# Patient Record
Sex: FEMALE | Race: OTHER | HISPANIC OR LATINO | URBAN - METROPOLITAN AREA
[De-identification: names, ages, dates, MRNs, and addresses within clinical notes are randomized per-mention and may not be internally consistent; named-entity substitution may affect disease eponyms.]

---

## 2017-09-16 ENCOUNTER — OUTPATIENT (OUTPATIENT)
Dept: OUTPATIENT SERVICES | Facility: HOSPITAL | Age: 37
LOS: 1 days | Discharge: HOME | End: 2017-09-16

## 2017-09-16 DIAGNOSIS — Z12.31 ENCOUNTER FOR SCREENING MAMMOGRAM FOR MALIGNANT NEOPLASM OF BREAST: ICD-10-CM

## 2019-01-15 ENCOUNTER — OUTPATIENT (OUTPATIENT)
Dept: OUTPATIENT SERVICES | Facility: HOSPITAL | Age: 39
LOS: 1 days | Discharge: HOME | End: 2019-01-15

## 2019-01-15 DIAGNOSIS — E11.9 TYPE 2 DIABETES MELLITUS WITHOUT COMPLICATIONS: ICD-10-CM

## 2019-01-15 DIAGNOSIS — R73.09 OTHER ABNORMAL GLUCOSE: ICD-10-CM

## 2019-01-15 DIAGNOSIS — Z13.0 ENCOUNTER FOR SCREENING FOR DISEASES OF THE BLOOD AND BLOOD-FORMING ORGANS AND CERTAIN DISORDERS INVOLVING THE IMMUNE MECHANISM: ICD-10-CM

## 2019-01-15 DIAGNOSIS — N39.0 URINARY TRACT INFECTION, SITE NOT SPECIFIED: ICD-10-CM

## 2019-01-15 DIAGNOSIS — R97.0 ELEVATED CARCINOEMBRYONIC ANTIGEN [CEA]: ICD-10-CM

## 2019-01-15 DIAGNOSIS — R79.89 OTHER SPECIFIED ABNORMAL FINDINGS OF BLOOD CHEMISTRY: ICD-10-CM

## 2019-01-15 DIAGNOSIS — Z00.00 ENCOUNTER FOR GENERAL ADULT MEDICAL EXAMINATION WITHOUT ABNORMAL FINDINGS: ICD-10-CM

## 2019-01-15 DIAGNOSIS — R94.6 ABNORMAL RESULTS OF THYROID FUNCTION STUDIES: ICD-10-CM

## 2019-01-15 DIAGNOSIS — Z13.21 ENCOUNTER FOR SCREENING FOR NUTRITIONAL DISORDER: ICD-10-CM

## 2019-01-15 DIAGNOSIS — R79.1 ABNORMAL COAGULATION PROFILE: ICD-10-CM

## 2019-01-15 DIAGNOSIS — Z13.220 ENCOUNTER FOR SCREENING FOR LIPOID DISORDERS: ICD-10-CM

## 2023-06-08 PROBLEM — Z00.00 ENCOUNTER FOR PREVENTIVE HEALTH EXAMINATION: Status: ACTIVE | Noted: 2023-06-08

## 2023-06-16 ENCOUNTER — APPOINTMENT (OUTPATIENT)
Dept: OBGYN | Facility: CLINIC | Age: 43
End: 2023-06-16

## 2023-08-01 ENCOUNTER — APPOINTMENT (OUTPATIENT)
Dept: PAIN MANAGEMENT | Facility: CLINIC | Age: 43
End: 2023-08-01
Payer: MEDICAID

## 2023-08-01 VITALS — WEIGHT: 118 LBS | BODY MASS INDEX: 15.98 KG/M2 | HEIGHT: 72 IN

## 2023-08-01 DIAGNOSIS — M54.16 RADICULOPATHY, LUMBAR REGION: ICD-10-CM

## 2023-08-01 DIAGNOSIS — Z86.79 PERSONAL HISTORY OF OTHER DISEASES OF THE CIRCULATORY SYSTEM: ICD-10-CM

## 2023-08-01 DIAGNOSIS — Z83.3 FAMILY HISTORY OF DIABETES MELLITUS: ICD-10-CM

## 2023-08-01 DIAGNOSIS — Z78.9 OTHER SPECIFIED HEALTH STATUS: ICD-10-CM

## 2023-08-01 DIAGNOSIS — Z82.49 FAMILY HISTORY OF ISCHEMIC HEART DISEASE AND OTHER DISEASES OF THE CIRCULATORY SYSTEM: ICD-10-CM

## 2023-08-01 PROCEDURE — 99204 OFFICE O/P NEW MOD 45 MIN: CPT

## 2023-08-01 RX ORDER — METHYLPREDNISOLONE 4 MG/1
4 TABLET ORAL
Qty: 1 | Refills: 0 | Status: ACTIVE | COMMUNITY
Start: 2023-08-01 | End: 1900-01-01

## 2023-08-01 RX ORDER — IBUPROFEN 800 MG/1
800 TABLET, FILM COATED ORAL
Refills: 0 | Status: ACTIVE | COMMUNITY

## 2023-08-02 ENCOUNTER — TRANSCRIPTION ENCOUNTER (OUTPATIENT)
Age: 43
End: 2023-08-02

## 2023-08-04 NOTE — HISTORY OF PRESENT ILLNESS
[FreeTextEntry1] : HPI: Ms. Luevano is a 43 year old female complaining of back pian. This pain started 3 days ago. The pain has been severe that is constant of the time with symptoms worsening in no typical pattern. This patient is currently in the progress of moving and has been lifting and carrying heavy objects. The patient presents with complaints of low back pain. Pain is located in the bilateral low back above the waistband. The pain is mainly located in the low back on both sides along the paraspinal muscles and the top of the buttock bilaterally. The pain is aching and throbbing in the back with occasional sharp stabbing sensations that can be very sudden and debilitating. The pain is made worse with standing from a seated position and with rotation to the left and right side. Pain is associated with stiffness. The patient's functional status and quality of life has been significantly impacted negatively from the pain and has not responded to conservative measures such as rest, heat, otc pain medications, and activity modifications. Pain is 8/10 at worst. Patient denies any fevers, chills, weight loss, bowel or bladder dysfunction, incontinence, or saddle anesthesia.  ACTIVITIES: Patient could walk 4-5 blocks before the pain starts.  Patient can sit  10-15 minutes before pain starts.  Patient can stand 10-15 minutes before pain starts.  The patient often lies down because of pain.  Patient uses no assisted walking device at this time.  Patient has difficulty performing household chores, going to work, and  exercise at this time.  Prior pain meds: Tylenol, ibuprofen 800 mg

## 2023-08-04 NOTE — PHYSICAL EXAM
[de-identified] : Lumbar Spine Exam: Inspection: erythema (-) ecchymosis (-) rashes (-) alignment: no scoliosis  Palpation: Midline lumbar tenderness:             (-) paraspinal tenderness:                  L (+) ; R (+) sciatic nerve tenderness :             L (+) ; R (+) SI joint tenderness:                        L (-) ; R (-) GTB tenderness:                            L (-);  R (-) pain is worse with flexion   Limited ROM 2/2 to pain  Strength: 5/5 throughout all muscle groups of the lower extremity                                     Right       Left    Hip Flexion:                (5/5)       (5/5) Quadriceps:               (5/5)       (5/5) Hamstrings:                (5/5)       (5/5) Ankle Dorsiflexion:     (5/5)       (5/5) EHL:                           (5/5)       (5/5) Ankle Plantarflexion:  (5/5)       (5/5)  Special Tests: SLR:                           R (-) ; L (-) Facet loading:            R (-) ; L (-) ABENA test:               R (-) ; L (-)  Neurologic: Light touch intact throughout LE  Reflexes normal and symmetric   Gait: non- antalgic gait ambulates w/o assistive device

## 2023-08-04 NOTE — DISCUSSION/SUMMARY
[de-identified] : A discussion regarding available pain management treatment options occurred with the patient.  These included interventional, rehabilitative, pharmacological, and alternative modalities. We will proceed with the following:  Interventional treatment options: - None indicated at present time  Rehabilitative options: Initiate trial of PT for the low back   - participation in active HEP was discussed Patient given specific exercises to do including but not limited to: side plank, Quadruped arm/leg raise, Extension exercise, and Glut Bridges   Medication based treatment options: - initiate trial of Medrol dose pack   Complementary treatment options: - lifestyle modifications discussed avoid lifting, avoid bending forward, bend with knees. Be mindful of good posture.   follow up 7 weeks   I Yadira Guerrier attest that this documentation has been prepared under the direction and in the presence of provider Dr. Ifeanyi Maldonado.  The documentation recorded by the scribe in my presence, accurately reflects the service I personally performed, and the decisions made by me with my edits as appropriate.   Ifeanyi Maldonado, DO

## 2023-08-25 ENCOUNTER — APPOINTMENT (OUTPATIENT)
Dept: ORTHOPEDIC SURGERY | Facility: CLINIC | Age: 43
End: 2023-08-25

## 2023-09-19 ENCOUNTER — APPOINTMENT (OUTPATIENT)
Dept: PAIN MANAGEMENT | Facility: CLINIC | Age: 43
End: 2023-09-19

## 2024-08-26 ENCOUNTER — APPOINTMENT (OUTPATIENT)
Dept: CARDIOLOGY | Facility: CLINIC | Age: 44
End: 2024-08-26
Payer: COMMERCIAL

## 2024-08-26 VITALS — DIASTOLIC BLOOD PRESSURE: 80 MMHG | HEART RATE: 81 BPM | SYSTOLIC BLOOD PRESSURE: 130 MMHG

## 2024-08-26 VITALS — HEIGHT: 72 IN | WEIGHT: 120 LBS | BODY MASS INDEX: 16.25 KG/M2

## 2024-08-26 DIAGNOSIS — D64.9 ANEMIA, UNSPECIFIED: ICD-10-CM

## 2024-08-26 DIAGNOSIS — Z82.3 FAMILY HISTORY OF STROKE: ICD-10-CM

## 2024-08-26 DIAGNOSIS — I20.9 ANGINA PECTORIS, UNSPECIFIED: ICD-10-CM

## 2024-08-26 DIAGNOSIS — I10 ESSENTIAL (PRIMARY) HYPERTENSION: ICD-10-CM

## 2024-08-26 DIAGNOSIS — E78.2 MIXED HYPERLIPIDEMIA: ICD-10-CM

## 2024-08-26 DIAGNOSIS — Z00.00 ENCOUNTER FOR GENERAL ADULT MEDICAL EXAMINATION W/OUT ABNORMAL FINDINGS: ICD-10-CM

## 2024-08-26 PROCEDURE — 93000 ELECTROCARDIOGRAM COMPLETE: CPT

## 2024-08-26 PROCEDURE — 99204 OFFICE O/P NEW MOD 45 MIN: CPT | Mod: 25

## 2024-08-26 RX ORDER — AMLODIPINE BESYLATE 5 MG/1
5 TABLET ORAL
Refills: 0 | Status: ACTIVE | COMMUNITY

## 2024-08-26 RX ORDER — METOPROLOL TARTRATE 100 MG/1
100 TABLET, FILM COATED ORAL
Qty: 2 | Refills: 0 | Status: ACTIVE | COMMUNITY
Start: 2024-08-26 | End: 1900-01-01

## 2024-08-26 RX ORDER — RANOLAZINE 500 MG/1
500 TABLET, EXTENDED RELEASE ORAL
Qty: 180 | Refills: 3 | Status: ACTIVE | COMMUNITY
Start: 2024-08-26 | End: 1900-01-01

## 2024-08-26 NOTE — PHYSICAL EXAM
[Normal Venous Pressure] : normal venous pressure [Normal S1, S2] : normal S1, S2 [Clear Lung Fields] : clear lung fields [Soft] : abdomen soft [No Edema] : no edema [de-identified] : rrr

## 2024-08-26 NOTE — DISCUSSION/SUMMARY
[FreeTextEntry1] : pt with anemia as etiology  start ranolazine 500 mg po q12  has fam h/o of cva  get carotid  get CTA 2 d echo  f/u after testing.  will d/w pmd about anemia.  [EKG obtained to assist in diagnosis and management of assessed problem(s)] : EKG obtained to assist in diagnosis and management of assessed problem(s)

## 2024-08-26 NOTE — PHYSICAL EXAM
[Normal Venous Pressure] : normal venous pressure [Normal S1, S2] : normal S1, S2 [Clear Lung Fields] : clear lung fields [Soft] : abdomen soft [No Edema] : no edema [de-identified] : rrr

## 2024-08-26 NOTE — HISTORY OF PRESENT ILLNESS
[FreeTextEntry1] : Patient with HTN, family hx of CVA mother in 50s and gm and moms siblings, anemia referred by MEGAN Church  Patient states 2-3 weeks ago she felt chest heaviness after patient hurt wrist 3-4 weeks ago. Pt told pmd and says intermittent felt like pressure for approximately an hour and relaxed and improved. no sob. pt says had one episode of numbness on her arm.  no n/v diaphoresis with episodes.  pt has been under stress. pt has episodes of palpitations. pt denies h/o anxiety. pt denies syncope. pt works as a nurse in a hospital.  8/24: HDL: 65,  TC: 201  h/h 10.9/32.3  pt does walking but does not exercise.

## 2024-08-29 ENCOUNTER — OUTPATIENT (OUTPATIENT)
Dept: OUTPATIENT SERVICES | Facility: HOSPITAL | Age: 44
LOS: 1 days | End: 2024-08-29
Payer: COMMERCIAL

## 2024-08-29 DIAGNOSIS — R76.12 NONSPECIFIC REACTION TO CELL MEDIATED IMMUNITY MEASUREMENT OF GAMMA INTERFERON ANTIGEN RESPONSE WITHOUT ACTIVE TUBERCULOSIS: ICD-10-CM

## 2024-08-29 PROCEDURE — 71046 X-RAY EXAM CHEST 2 VIEWS: CPT

## 2024-08-29 PROCEDURE — 71046 X-RAY EXAM CHEST 2 VIEWS: CPT | Mod: 26

## 2024-08-30 ENCOUNTER — APPOINTMENT (OUTPATIENT)
Dept: CARDIOLOGY | Facility: CLINIC | Age: 44
End: 2024-08-30

## 2024-08-30 DIAGNOSIS — R76.12 NONSPECIFIC REACTION TO CELL MEDIATED IMMUNITY MEASUREMENT OF GAMMA INTERFERON ANTIGEN RESPONSE WITHOUT ACTIVE TUBERCULOSIS: ICD-10-CM

## 2024-08-30 PROCEDURE — 93880 EXTRACRANIAL BILAT STUDY: CPT

## 2024-09-12 ENCOUNTER — APPOINTMENT (OUTPATIENT)
Dept: CARDIOLOGY | Facility: CLINIC | Age: 44
End: 2024-09-12

## 2024-09-12 PROCEDURE — 93306 TTE W/DOPPLER COMPLETE: CPT

## 2024-09-19 ENCOUNTER — RESULT REVIEW (OUTPATIENT)
Age: 44
End: 2024-09-19

## 2024-09-19 ENCOUNTER — OUTPATIENT (OUTPATIENT)
Dept: OUTPATIENT SERVICES | Facility: HOSPITAL | Age: 44
LOS: 1 days | End: 2024-09-19
Payer: COMMERCIAL

## 2024-09-19 DIAGNOSIS — Z00.8 ENCOUNTER FOR OTHER GENERAL EXAMINATION: ICD-10-CM

## 2024-09-19 DIAGNOSIS — I20.9 ANGINA PECTORIS, UNSPECIFIED: ICD-10-CM

## 2024-09-19 DIAGNOSIS — E78.2 MIXED HYPERLIPIDEMIA: ICD-10-CM

## 2024-09-19 PROCEDURE — 75574 CT ANGIO HRT W/3D IMAGE: CPT

## 2024-09-19 PROCEDURE — 75574 CT ANGIO HRT W/3D IMAGE: CPT | Mod: 26

## 2024-09-20 DIAGNOSIS — E78.2 MIXED HYPERLIPIDEMIA: ICD-10-CM

## 2024-09-20 DIAGNOSIS — I20.9 ANGINA PECTORIS, UNSPECIFIED: ICD-10-CM

## 2024-09-30 ENCOUNTER — APPOINTMENT (OUTPATIENT)
Dept: CARDIOLOGY | Facility: CLINIC | Age: 44
End: 2024-09-30

## 2024-10-04 ENCOUNTER — APPOINTMENT (OUTPATIENT)
Dept: CARDIOLOGY | Facility: CLINIC | Age: 44
End: 2024-10-04
Payer: COMMERCIAL

## 2024-10-04 VITALS
BODY MASS INDEX: 23.56 KG/M2 | WEIGHT: 120 LBS | HEIGHT: 60 IN | HEART RATE: 74 BPM | SYSTOLIC BLOOD PRESSURE: 146 MMHG | DIASTOLIC BLOOD PRESSURE: 89 MMHG

## 2024-10-04 DIAGNOSIS — I10 ESSENTIAL (PRIMARY) HYPERTENSION: ICD-10-CM

## 2024-10-04 DIAGNOSIS — E78.2 MIXED HYPERLIPIDEMIA: ICD-10-CM

## 2024-10-04 PROCEDURE — 99204 OFFICE O/P NEW MOD 45 MIN: CPT

## 2024-10-04 NOTE — END OF VISIT
[Time Spent: ___ minutes] : I have spent [unfilled] minutes of time on the encounter which excludes teaching and separately reported services. Attending with

## 2024-10-06 NOTE — HISTORY OF PRESENT ILLNESS
[FreeTextEntry1] : Of note, MAXWELL SANDOVAL is a patient of Dr. Estrella.  MAXWELL SANDOVAL is a 44-year-old female, with a PMHx significant for HTN, HLD, and anemia, who presents today for a follow-up visit for results. Echo was WNL. CCTA revealed normal coronary arteries. Carotid duplex revealed tortuous carotid arteries, otherwise no obstructive disease. Patient discontinued Amlodipine due to leg edema. BP has been mildly elevated, mostly diastolic HTN upon review of BP diary.   Family History: (+) CVA: mother in 50s, grandmother, mother's siblings.

## 2024-10-06 NOTE — CARDIOLOGY SUMMARY
[de-identified] : 10/04/2024: NSR, normal axis, normal intervals, (-) ST-T wave changes. ======================================================= [de-identified] : TTE - 09/12/2024: CONCLUSIONS: 1. Left ventricular cavity is normal in size. Left ventricular wall thickness is normal. Left ventricular systolic function is normal with an ejection fraction of 66 % by Tavarez's method of disks. There are no regional wall motion abnormalities seen. 2. E' sept: 0.07 m/s, lvot vti: 20.7 cm, GLS: -21.1%. 3. Normal left ventricular diastolic function, with normal left ventricular filling pressure. 4. Normal right ventricular cavity size, with normal wall thickness, and normal right ventricular systolic function. 5. Normal left and right atrial size. 6. No pericardial effusion seen. 7. Mild tricuspid regurgitation. 8. No left ventricular hypertrophy. 9. Thickened mitral valve leaflets. ======================================================= [de-identified] : CCTA - 09/19/2024: IMPRESSION: Normal coronary arteries. CAD-RADS 0. ======================================================= [de-identified] : Carotid Duplex - 08/30/2024: CONCLUSIONS: 1. Right: distal ICA with elevated velocities up to 180 cm/s, likely due to vessel tortuosity. 2. Left: proximal ICA <50% stenosis, vessel tortuous. 3. Vertebral arteries: antegrade flow bilaterally. 4. Subclavian arteries: no significant atherosclerosis bilaterally. 5. No prior exam is available for comparison. 6. Consider further evaluation with dedicated cross-sectional imaging if clinically indicated. =======================================================

## 2024-10-06 NOTE — REVIEW OF SYSTEMS
[Negative] : Heme/Lymph [SOB] : no shortness of breath [Dyspnea on exertion] : not dyspnea during exertion [Chest Discomfort] : no chest discomfort [Leg Claudication] : no intermittent leg claudication [Palpitations] : no palpitations [Syncope] : no syncope [FreeTextEntry5] : (+) Prior LE Edema

## 2024-10-06 NOTE — CARDIOLOGY SUMMARY
[de-identified] : 10/04/2024: NSR, normal axis, normal intervals, (-) ST-T wave changes. ======================================================= [de-identified] : TTE - 09/12/2024: CONCLUSIONS: 1. Left ventricular cavity is normal in size. Left ventricular wall thickness is normal. Left ventricular systolic function is normal with an ejection fraction of 66 % by Tavarez's method of disks. There are no regional wall motion abnormalities seen. 2. E' sept: 0.07 m/s, lvot vti: 20.7 cm, GLS: -21.1%. 3. Normal left ventricular diastolic function, with normal left ventricular filling pressure. 4. Normal right ventricular cavity size, with normal wall thickness, and normal right ventricular systolic function. 5. Normal left and right atrial size. 6. No pericardial effusion seen. 7. Mild tricuspid regurgitation. 8. No left ventricular hypertrophy. 9. Thickened mitral valve leaflets. ======================================================= [de-identified] : CCTA - 09/19/2024: IMPRESSION: Normal coronary arteries. CAD-RADS 0. ======================================================= [de-identified] : Carotid Duplex - 08/30/2024: CONCLUSIONS: 1. Right: distal ICA with elevated velocities up to 180 cm/s, likely due to vessel tortuosity. 2. Left: proximal ICA <50% stenosis, vessel tortuous. 3. Vertebral arteries: antegrade flow bilaterally. 4. Subclavian arteries: no significant atherosclerosis bilaterally. 5. No prior exam is available for comparison. 6. Consider further evaluation with dedicated cross-sectional imaging if clinically indicated. =======================================================

## 2024-10-06 NOTE — DISCUSSION/SUMMARY
[FreeTextEntry1] : EKG performed today was unremarkable.  CCTA, echo, and carotid duplex results discussed in detail with patient.  HTN: The impression is hypertension. Recommend decreasing Amlodipine dose to 2.5 mg and reassessing symptoms of edema. If still persistent, will discontinue Amlodipine and start Bystolic 2.5 mg QD. Other planned treatments include an exercise regimen, weight loss, low sodium diet, and alcohol moderation.  HLD: The impression is hyperlipidemia. There are no changes in medication management at this time. Other planned treatment includes diet modification, exercise, and weight loss.  Instructed to follow up PRN.  Plan was discussed with the patient.    I, Dr. Carey, personally performed the evaluation and management services for this patient. That E&M includes reviewing prior history/tests, conducting the medically appropriate examination and evaluation, assessing all conditions, establishing a plan of care, ordering medications, and counselling and educating the patient. I spent a total of 30 minutes on today's encounter evaluating and treating the patient.